# Patient Record
Sex: FEMALE | NOT HISPANIC OR LATINO | Employment: STUDENT | URBAN - METROPOLITAN AREA
[De-identification: names, ages, dates, MRNs, and addresses within clinical notes are randomized per-mention and may not be internally consistent; named-entity substitution may affect disease eponyms.]

---

## 2017-05-23 ENCOUNTER — OFFICE VISIT (OUTPATIENT)
Dept: URGENT CARE | Facility: CLINIC | Age: 22
End: 2017-05-23
Payer: COMMERCIAL

## 2017-05-23 VITALS
HEART RATE: 84 BPM | SYSTOLIC BLOOD PRESSURE: 110 MMHG | DIASTOLIC BLOOD PRESSURE: 56 MMHG | RESPIRATION RATE: 14 BRPM | TEMPERATURE: 99.1 F | OXYGEN SATURATION: 98 % | HEIGHT: 64 IN | BODY MASS INDEX: 25.2 KG/M2 | WEIGHT: 147.6 LBS

## 2017-05-23 DIAGNOSIS — L08.9 SKIN INFECTION: ICD-10-CM

## 2017-05-23 PROCEDURE — 99214 OFFICE O/P EST MOD 30 MIN: CPT | Performed by: NURSE PRACTITIONER

## 2017-05-23 RX ORDER — SULFAMETHOXAZOLE AND TRIMETHOPRIM 800; 160 MG/1; MG/1
1 TABLET ORAL 2 TIMES DAILY
Qty: 14 TAB | Refills: 0 | Status: SHIPPED | OUTPATIENT
Start: 2017-05-23 | End: 2017-05-30

## 2017-05-23 ASSESSMENT — ENCOUNTER SYMPTOMS
VOMITING: 0
CHILLS: 0
MYALGIAS: 0
NAUSEA: 0

## 2017-05-23 NOTE — PROGRESS NOTES
"Subjective:      Yonny Stern is a 22 y.o. female who presents with Cyst            Cyst  This is a new problem. The current episode started in the past 7 days. The problem occurs constantly. The problem has been gradually improving. Pertinent negatives include no chills, myalgias, nausea or vomiting. She has tried nothing for the symptoms.   she has a small, draining sore on her left nipple.  Allergies, medications and history reviewed by me today    Review of Systems   Constitutional: Negative for chills.   Gastrointestinal: Negative for nausea and vomiting.   Musculoskeletal: Negative for myalgias.          Objective:     /56 mmHg  Pulse 84  Temp(Src) 37.3 °C (99.1 °F)  Resp 14  Ht 1.626 m (5' 4\")  Wt 66.951 kg (147 lb 9.6 oz)  BMI 25.32 kg/m2  SpO2 98%     Physical Exam   Constitutional: She is oriented to person, place, and time. She appears well-developed and well-nourished. No distress.   Cardiovascular: Normal rate, regular rhythm and normal heart sounds.    No murmur heard.  Pulmonary/Chest: Effort normal and breath sounds normal.   Musculoskeletal: Normal range of motion.   Moves all 4 extremities normally   Neurological: She is alert and oriented to person, place, and time.   Skin: Skin is warm and dry.   Small sore with opening on nipple of left breast. No swelling, induration or erythema. Per patient, has been draining pus.   Nursing note and vitals reviewed.              Assessment/Plan:     1. Skin infection  sulfamethoxazole-trimethoprim (BACTRIM DS) 800-160 MG tablet   neosporin to site.  Warm moist compresses.  Bactrim x 7 days.  Differential diagnosis, natural history, supportive care, and indications for immediate follow-up discussed at length.     "

## 2017-05-23 NOTE — MR AVS SNAPSHOT
"        Yonny Stern   2017 3:30 PM   Office Visit   MRN: [id number]    Department:  [facility]   Dept Phone:  [phone]    Description:  Female : 1995   Provider:  DONNA Salazar           Reason for Visit     Cyst x5days, possible abscess on left breast, puss coming out twice durning shower, darkening      Allergies as of 2017     No Known Allergies      You were diagnosed with     Skin infection   [[id number]]         Vital Signs     Blood Pressure Pulse Temperature Respirations Height Weight    110/56 mmHg 84 37.3 °C (99.1 °F) 14 1.626 m (5' 4\") 66.951 kg (147 lb 9.6 oz)    Body Mass Index Oxygen Saturation                25.32 kg/m2 98%          Basic Information     Date Of Birth Sex Race Ethnicity Preferred Language    1995 Female Unable to Obtain Non- English      Health Maintenance        Date Due Completion Dates    IMM HEP B VACCINE (1 of 3 - Primary Series) 1995 ---    IMM HEP A VACCINE (1 of 2 - Standard Series) 5/3/1997 ---    IMM HPV VACCINE (1 of 3 - Female 3 Dose Series) 5/3/2007 ---    IMM VARICELLA (CHICKENPOX) VACCINE (1 of 2 - 2 Dose Adolescent Series) 5/3/2009 ---    IMM MENINGOCOCCAL VACCINE (MCV4) (1 of 1) 5/3/2012 ---    IMM DTaP/Tdap/Td Vaccine (1 - Tdap) 5/3/2015 ---    PAP SMEAR 5/3/2017 ---            Current Immunizations     No immunizations on file.      Below and/or attached are the medications your provider expects you to take. Review all of your home medications and newly ordered medications with your provider and/or pharmacist. Follow medication instructions as directed by your provider and/or pharmacist. Please keep your medication list with you and share with your provider. Update the information when medications are discontinued, doses are changed, or new medications (including over-the-counter products) are added; and carry medication information at all times in the event of emergency situations     Allergies:  No Known " Allergies          Medications  Valid as of: May 23, 2017 -  3:59 PM    Generic Name Brand Name Tablet Size Instructions for use    Ibuprofen (Tab) MOTRIN 800 MG Take 1 Tab by mouth every 8 hours as needed.        Sulfamethoxazole-Trimethoprim (Tab) BACTRIM -160 MG Take 1 Tab by mouth 2 times a day for 7 days.        Topiramate   Take  by mouth.        .                 Medicines prescribed today were sent to:     [id number] - [place], [place] - [address]    [address] [place] [address]    Phone: [phone] Fax: [phone]    Open 24 Hours?: No      Medication refill instructions:       If your prescription bottle indicates you have medication refills left, it is not necessary to call your provider’s office. Please contact your pharmacy and they will refill your medication.    If your prescription bottle indicates you do not have any refills left, you may request refills at any time through one of the following ways: The online Photos I Like system (except Urgent Care), by calling your provider’s office, or by asking your pharmacy to contact your provider’s office with a refill request. Medication refills are processed only during regular business hours and may not be available until the next business day. Your provider may request additional information or to have a follow-up visit with you prior to refilling your medication.   *Please Note: Medication refills are assigned a new Rx number when refilled electronically. Your pharmacy may indicate that no refills were authorized even though a new prescription for the same medication is available at the pharmacy. Please request the medicine by name with the pharmacy before contacting your provider for a refill.           Photos I Like Access Code: [email or web address]  Expires: 6/22/2017  3:59 PM    Your email address is not on file at [facility].  Email Addresses are required for you to sign up for [employer] Like, please contact [phone] to verify your personal  information and to provide your email address prior to attempting to register for [employer].    Yonny Stern  [address] [place] [address]    [employer]  A secure, online tool to manage your health information     [employer]’s [employer]® is a secure, online tool that connects you to your personalized health information from the privacy of your home -- day or night - making it very easy for you to manage your healthcare. Once the activation process is completed, you can even access your medical information using the [employer] lobo, which is available for free in the Apple Lobo store or Google Play store.     To learn more about [employer], visit [email or web address]    There are two levels of access available (as shown below):   My Chart Features  [facility] Primary Care Doctor [facility]  Specialists [facility]  Urgent  Care Non-[facility] Primary Care Doctor   Email your healthcare team securely and privately 24/7 X X X    Manage appointments: schedule your next appointment; view details of past/upcoming appointments X      Request prescription refills. X      View recent personal medical records, including lab and immunizations X X X X   View health record, including health history, allergies, medications X X X X   Read reports about your outpatient visits, procedures, consult and ER notes X X X X   See your discharge summary, which is a recap of your hospital and/or ER visit that includes your diagnosis, lab results, and care plan X X  X     How to register for [employer]:  Once your e-mail address has been verified, follow the following steps to sign up for [employer].     1. Go to  [email or web address]  2. Click on the Sign Up Now box, which takes you to the New Member Sign Up page. You will need to provide the following information:  a. Enter your [employer] Access Code exactly as it appears at the top of this page. (You will not need to use this code after you’ve completed the sign-up process. If you do not sign up before  the expiration date, you must request a new code.)   b. Enter your date of birth.   c. Enter your home email address.   d. Click Submit, and follow the next screen’s instructions.  3. Create a SourceThought ID. This will be your SourceThought login ID and cannot be changed, so think of one that is secure and easy to remember.  4. Create a [employer] password. You can change your password at any time.  5. Enter your Password Reset Question and Answer. This can be used at a later time if you forget your password.   6. Enter your e-mail address. This allows you to receive e-mail notifications when new information is available in SourceThought.  7. Click Sign Up. You can now view your health information.    For assistance activating your SourceThought account, call [phone]

## 2020-03-27 ENCOUNTER — HOSPITAL ENCOUNTER (OUTPATIENT)
Facility: MEDICAL CENTER | Age: 25
End: 2020-03-27
Attending: NURSE PRACTITIONER
Payer: COMMERCIAL

## 2020-03-27 ENCOUNTER — OFFICE VISIT (OUTPATIENT)
Dept: URGENT CARE | Facility: CLINIC | Age: 25
End: 2020-03-27
Payer: COMMERCIAL

## 2020-03-27 VITALS
HEART RATE: 70 BPM | WEIGHT: 157 LBS | BODY MASS INDEX: 26.8 KG/M2 | TEMPERATURE: 98.6 F | SYSTOLIC BLOOD PRESSURE: 110 MMHG | DIASTOLIC BLOOD PRESSURE: 80 MMHG | HEIGHT: 64 IN | OXYGEN SATURATION: 97 %

## 2020-03-27 DIAGNOSIS — R31.9 URINARY TRACT INFECTION WITH HEMATURIA, SITE UNSPECIFIED: ICD-10-CM

## 2020-03-27 DIAGNOSIS — N39.0 URINARY TRACT INFECTION WITH HEMATURIA, SITE UNSPECIFIED: ICD-10-CM

## 2020-03-27 DIAGNOSIS — R39.9 UTI SYMPTOMS: ICD-10-CM

## 2020-03-27 LAB
APPEARANCE UR: NORMAL
BILIRUB UR STRIP-MCNC: NORMAL MG/DL
COLOR UR AUTO: YELLOW
GLUCOSE UR STRIP.AUTO-MCNC: NORMAL MG/DL
KETONES UR STRIP.AUTO-MCNC: NORMAL MG/DL
LEUKOCYTE ESTERASE UR QL STRIP.AUTO: NORMAL
NITRITE UR QL STRIP.AUTO: NORMAL
PH UR STRIP.AUTO: 6 [PH] (ref 5–8)
PROT UR QL STRIP: NORMAL MG/DL
RBC UR QL AUTO: NORMAL
SP GR UR STRIP.AUTO: >=1.03
UROBILINOGEN UR STRIP-MCNC: 1 MG/DL

## 2020-03-27 PROCEDURE — 87086 URINE CULTURE/COLONY COUNT: CPT

## 2020-03-27 PROCEDURE — 87077 CULTURE AEROBIC IDENTIFY: CPT

## 2020-03-27 PROCEDURE — 99214 OFFICE O/P EST MOD 30 MIN: CPT | Performed by: NURSE PRACTITIONER

## 2020-03-27 PROCEDURE — 87186 SC STD MICRODIL/AGAR DIL: CPT

## 2020-03-27 PROCEDURE — 81002 URINALYSIS NONAUTO W/O SCOPE: CPT | Performed by: NURSE PRACTITIONER

## 2020-03-27 RX ORDER — CITALOPRAM 10 MG/1
10 TABLET, FILM COATED ORAL DAILY
COMMUNITY

## 2020-03-27 RX ORDER — SULFAMETHOXAZOLE AND TRIMETHOPRIM 800; 160 MG/1; MG/1
1 TABLET ORAL 2 TIMES DAILY
COMMUNITY

## 2020-03-27 RX ORDER — CIPROFLOXACIN 500 MG/1
500 TABLET, FILM COATED ORAL EVERY 12 HOURS
Qty: 14 TAB | Refills: 0 | Status: SHIPPED | OUTPATIENT
Start: 2020-03-27 | End: 2020-04-03

## 2020-03-28 NOTE — PROGRESS NOTES
Subjective:     Yonny Stern is a 24 y.o. female who presents for Dysuria (x5 days. Dysuria.)       Dysuria    This is a new problem. The problem has been gradually worsening. The quality of the pain is described as burning. Associated symptoms include hesitancy. Pertinent negatives include no chills, flank pain, nausea or vomiting.     Patient reports that 5 days ago she started to experience some dysuria and urinary hesitancy.  Reports a history of multiple UTIs in the past month.  Has been on multiple antibiotics.  Cannot recall all of them but reports she was most recently placed on Bactrim DS through telemedicine.  However, does not notice any improvement in her symptoms.    PMH:  has no past medical history on file.    MEDS:   Current Outpatient Medications:   •  citalopram (CELEXA) 10 MG tablet, Take 10 mg by mouth every day., Disp: , Rfl:   •  sulfamethoxazole-trimethoprim (BACTRIM DS) 800-160 MG tablet, Take 1 Tab by mouth 2 times a day., Disp: , Rfl:   •  ciprofloxacin (CIPRO) 500 MG Tab, Take 1 Tab by mouth every 12 hours for 7 days., Disp: 14 Tab, Rfl: 0  •  Topiramate (TOPAMAX PO), Take  by mouth., Disp: , Rfl:   •  ibuprofen (MOTRIN) 800 MG Tab, Take 1 Tab by mouth every 8 hours as needed. (Patient not taking: Reported on 3/27/2020), Disp: 30 Tab, Rfl: 3    ALLERGIES: No Known Allergies    SURGHX: History reviewed. No pertinent surgical history.    SOCHX:  reports that she has never smoked. She has never used smokeless tobacco.     FH: Reviewed with patient, not pertinent to this visit.    Review of Systems   Constitutional: Negative.  Negative for chills, fever and malaise/fatigue.   Gastrointestinal: Negative for abdominal pain, nausea and vomiting.   Genitourinary: Positive for dysuria and hesitancy. Negative for flank pain.   Musculoskeletal: Negative for back pain.   All other systems reviewed and are negative.    Additional details per HPI.    Objective:     /80   Pulse 70   Temp 37  "°C (98.6 °F)   Ht 1.626 m (5' 4\")   Wt 71.2 kg (157 lb)   LMP 03/13/2020   SpO2 97%   BMI 26.95 kg/m²     Physical Exam  Vitals signs reviewed.   Constitutional:       General: She is not in acute distress.     Appearance: She is well-developed. She is not ill-appearing, toxic-appearing or diaphoretic.   HENT:      Head: Normocephalic.      Right Ear: External ear normal.      Left Ear: External ear normal.      Nose: Nose normal.   Eyes:      Extraocular Movements: Extraocular movements intact.      Conjunctiva/sclera: Conjunctivae normal.   Neck:      Musculoskeletal: Normal range of motion.   Cardiovascular:      Rate and Rhythm: Normal rate.   Pulmonary:      Effort: Pulmonary effort is normal. No respiratory distress.   Abdominal:      General: There is no distension.      Palpations: Abdomen is soft.      Tenderness: There is abdominal tenderness in the suprapubic area. There is no right CVA tenderness or left CVA tenderness.   Musculoskeletal: Normal range of motion.         General: No deformity.   Skin:     General: Skin is warm and dry.      Coloration: Skin is not pale.   Neurological:      Mental Status: She is alert and oriented to person, place, and time.      Sensory: No sensory deficit.      Coordination: Coordination normal.   Psychiatric:         Behavior: Behavior normal. Behavior is cooperative.          Assessment/Plan:     1. Urinary tract infection with hematuria, site unspecified  - ciprofloxacin (CIPRO) 500 MG Tab; Take 1 Tab by mouth every 12 hours for 7 days.  Dispense: 14 Tab; Refill: 0  - URINE CULTURE(NEW); Future    2. UTI symptoms  - POCT Urinalysis    UA positive for large blood, trace protein, and small LE.  Urine culture pending.    Patient reports multiple antibiotic use for recurrent UTI in the past month. Recently placed on Bactrim DS with no improvement in the symptoms. Concern for resistance. Coverage with Cipro for complicated UTI.    Discussed close monitoring and " supportive measures including increasing fluids and rest as well as OTC symptom management per 's instructions.    Vital signs stable, afebrile, asymptomatic, no acute distress.    Warning signs reviewed. Return precautions discussed.    Patient advised to: Return for 1) Symptoms don't improve or worsen, or go to ER, 2) Follow up with primary care in 7-10 days.    Differential diagnosis, natural history, supportive care, and indications for immediate follow-up discussed. All questions answered. Patient agrees with the plan of care.

## 2020-03-29 LAB
BACTERIA UR CULT: ABNORMAL
BACTERIA UR CULT: ABNORMAL
SIGNIFICANT IND 70042: ABNORMAL
SITE SITE: ABNORMAL
SOURCE SOURCE: ABNORMAL

## 2020-03-29 ASSESSMENT — ENCOUNTER SYMPTOMS
FEVER: 0
FLANK PAIN: 0
VOMITING: 0
NAUSEA: 0
CONSTITUTIONAL NEGATIVE: 1
CHILLS: 0
BACK PAIN: 0
ABDOMINAL PAIN: 0

## 2020-04-05 ENCOUNTER — HOSPITAL ENCOUNTER (OUTPATIENT)
Facility: MEDICAL CENTER | Age: 25
End: 2020-04-05
Attending: PHYSICIAN ASSISTANT
Payer: COMMERCIAL

## 2020-04-05 ENCOUNTER — OFFICE VISIT (OUTPATIENT)
Dept: URGENT CARE | Facility: CLINIC | Age: 25
End: 2020-04-05
Payer: COMMERCIAL

## 2020-04-05 VITALS
WEIGHT: 165 LBS | DIASTOLIC BLOOD PRESSURE: 70 MMHG | SYSTOLIC BLOOD PRESSURE: 112 MMHG | HEART RATE: 80 BPM | OXYGEN SATURATION: 98 % | RESPIRATION RATE: 18 BRPM | TEMPERATURE: 98.5 F | BODY MASS INDEX: 28.32 KG/M2

## 2020-04-05 DIAGNOSIS — R30.0 DYSURIA: ICD-10-CM

## 2020-04-05 LAB
APPEARANCE UR: NORMAL
BILIRUB UR STRIP-MCNC: NORMAL MG/DL
COLOR UR AUTO: NORMAL
GLUCOSE UR STRIP.AUTO-MCNC: NORMAL MG/DL
KETONES UR STRIP.AUTO-MCNC: NORMAL MG/DL
LEUKOCYTE ESTERASE UR QL STRIP.AUTO: NORMAL
NITRITE UR QL STRIP.AUTO: NORMAL
PH UR STRIP.AUTO: 7.5 [PH] (ref 5–8)
PROT UR QL STRIP: NORMAL MG/DL
RBC UR QL AUTO: NORMAL
SP GR UR STRIP.AUTO: 1.02
UROBILINOGEN UR STRIP-MCNC: 0.2 MG/DL

## 2020-04-05 PROCEDURE — 99000 SPECIMEN HANDLING OFFICE-LAB: CPT | Performed by: PHYSICIAN ASSISTANT

## 2020-04-05 PROCEDURE — 81002 URINALYSIS NONAUTO W/O SCOPE: CPT | Performed by: PHYSICIAN ASSISTANT

## 2020-04-05 PROCEDURE — 99213 OFFICE O/P EST LOW 20 MIN: CPT | Performed by: PHYSICIAN ASSISTANT

## 2020-04-05 PROCEDURE — 87086 URINE CULTURE/COLONY COUNT: CPT

## 2020-04-05 ASSESSMENT — ENCOUNTER SYMPTOMS
NAUSEA: 0
CHILLS: 0
VOMITING: 0
ABDOMINAL PAIN: 0
DIZZINESS: 0
FEVER: 0
HEADACHES: 0
FLANK PAIN: 0

## 2020-04-05 NOTE — PROGRESS NOTES
Subjective:      Yonny Stern is a 24 y.o. female who presents with Dysuria            HPI  24-year-old female presents urgent care with new problem of dysuria onset about 2 weeks ago.  Patient was seen in urgent care for same on 3/27/2020.  She was started on Bactrim. Urine culture showed resistance to Bactrim and she was switched to Cipro.  Patient states her symptoms never fully resolved.  She denies flank pain, hematuria, fevers, abdominal pain, or vomiting.  Last normal menstrual period was 1 month ago.  Denies pregnancy.   No vaginal discharge. Denies exposure to STDs.   Denies other associated aggravating or alleviating factors.     Review of Systems   Constitutional: Negative for chills, fever and malaise/fatigue.   Gastrointestinal: Negative for abdominal pain, nausea and vomiting.   Genitourinary: Positive for dysuria. Negative for flank pain, frequency, hematuria and urgency.   Neurological: Negative for dizziness and headaches.   All other systems reviewed and are negative.      History reviewed. No pertinent past medical history.  Medications and allergies reviewed in [facility].  Social History     Tobacco Use   • Smoking status: Never Smoker   • Smokeless tobacco: Never Used   Substance Use Topics   • Alcohol use: Not on file      Objective:     /70   Pulse 80   Temp 36.9 °C (98.5 °F) (Temporal)   Resp 18   Wt 74.8 kg (165 lb)   LMP 03/13/2020   SpO2 98%   BMI 28.32 kg/m²      Physical Exam  Vitals signs reviewed.   Constitutional:       General: She is not in acute distress.     Appearance: Normal appearance. She is well-developed. She is not ill-appearing.   HENT:      Head: Normocephalic and atraumatic.      Mouth/Throat:      Mouth: Mucous membranes are moist.      Pharynx: Oropharynx is clear.   Eyes:      Conjunctiva/sclera: Conjunctivae normal.   Neck:      Musculoskeletal: Normal range of motion and neck supple.   Cardiovascular:      Rate and Rhythm: Normal rate and regular  rhythm.   Pulmonary:      Effort: Pulmonary effort is normal. No respiratory distress.   Abdominal:      General: There is no distension.      Palpations: Abdomen is soft.      Tenderness: There is no abdominal tenderness. There is no right CVA tenderness, left CVA tenderness, guarding or rebound.   Skin:     General: Skin is warm and dry.   Neurological:      General: No focal deficit present.      Mental Status: She is alert and oriented to person, place, and time.   Psychiatric:         Mood and Affect: Mood normal.         Behavior: Behavior normal.         Thought Content: Thought content normal.         Judgment: Judgment normal.                 Assessment/Plan:     1. Dysuria  POCT Urinalysis    Urine Culture     Results for orders placed or performed in visit on 04/05/20   POCT Urinalysis   Result Value Ref Range    POC Color DARK YELLOW Negative    POC Appearance CLOUDY Negative    POC Leukocyte Esterase neg Negative    POC Nitrites neg Negative    POC Urobiligen 0.2 Negative (0.2) mg/dL    POC Protein neg Negative mg/dL    POC Urine PH 7.5 5.0 - 8.0    POC Blood neg Negative    POC Specific Gravity 1.020 <1.005 - >1.030    POC Ketones neg Negative mg/dL    POC Bilirubin neg Negative mg/dL    POC Glucose neg Negative mg/dL     Will follow up pending urine culture results if any changes in antibiotic treatment is indicated.    PT should follow up with PCP in 1-2 days for re-evaluation if symptoms have not improved.  Discussed red flags and reasons to return to UC or ED.  Pt and/or family verbalized understanding of diagnosis and follow up instructions and was offered informational handout on diagnosis.  PT discharged.

## 2020-04-05 NOTE — PROGRESS NOTES
Subjective:      Yonny Stern is a 24 y.o. female who presents with Dysuria            HPI  LNMP: x 1 month ago.   Denies pregnancy   finished abx x 2 days ago  ROS    History reviewed. No pertinent past medical history.  Medications and allergies reviewed in epic.  Social History     Tobacco Use   • Smoking status: Never Smoker   • Smokeless tobacco: Never Used   Substance Use Topics   • Alcohol use: Not on file      Objective:     /70   Pulse 80   Temp 36.9 °C (98.5 °F) (Temporal)   Resp 18   Wt 74.8 kg (165 lb)   LMP 03/13/2020   SpO2 98%   BMI 28.32 kg/m²      Physical Exam            Assessment/Plan:

## 2020-04-08 LAB
BACTERIA UR CULT: NORMAL
SIGNIFICANT IND 70042: NORMAL
SITE SITE: NORMAL
SOURCE SOURCE: NORMAL

## 2024-09-11 ENCOUNTER — APPOINTMENT (RX ONLY)
Dept: URBAN - METROPOLITAN AREA CLINIC 73 | Facility: CLINIC | Age: 29
Setting detail: DERMATOLOGY
End: 2024-09-11

## 2024-09-11 DIAGNOSIS — L64.8 OTHER ANDROGENIC ALOPECIA: ICD-10-CM

## 2024-09-11 PROCEDURE — 99204 OFFICE O/P NEW MOD 45 MIN: CPT

## 2024-09-11 PROCEDURE — ? ORDER TESTS

## 2024-09-11 PROCEDURE — ? PATIENT SPECIFIC COUNSELING

## 2024-09-11 PROCEDURE — ? COUNSELING

## 2024-09-11 ASSESSMENT — LOCATION DETAILED DESCRIPTION DERM: LOCATION DETAILED: RIGHT SUPERIOR PARIETAL SCALP

## 2024-09-11 ASSESSMENT — LOCATION ZONE DERM: LOCATION ZONE: SCALP

## 2024-09-11 ASSESSMENT — LOCATION SIMPLE DESCRIPTION DERM: LOCATION SIMPLE: SCALP

## 2024-09-11 NOTE — PROCEDURE: PATIENT SPECIFIC COUNSELING
hair loss for few years, but more accelerated after covid - Pt reports had covid in 2021, 2022, and then last month\\n-see hair loss questionnaire: +family hx of androgenic alopecia\\nPt reports lost 20 lbs in 6 months when going gluten-free\\nPt reports very anxious in general -\\nPt reports treating anxiety with therapy\\nDisc widening hair part \\nNegative hair pull \\nDisc labs, pt reports no labs recently\\nDisc topical minoxidil r/b/sed\\nDisc oral minoxidil r/b/sed\\nDisc spironolactone r/b/sed\\nDisc oral birth control r/b/sed\\nPt opted to get labs and consider treatment once we have the results\\n\\nrtc 2-3mo
Detail Level: Detailed

## 2024-09-11 NOTE — HPI: HAIR LOSS
How Did The Hair Loss Occur?: gradual in onset
How Severe Is Your Hair Loss?: moderate
Additional History: Pt presents for hair loss, states started 10 years ago, and got worse once pt had covid

## 2024-09-11 NOTE — PROCEDURE: ORDER TESTS
Bill For Surgical Tray: no
Performing Laboratory: 0
Billing Type: Third-Party Bill
Expected Date Of Service: 09/11/2024